# Patient Record
Sex: FEMALE | Race: WHITE | NOT HISPANIC OR LATINO | URBAN - METROPOLITAN AREA
[De-identification: names, ages, dates, MRNs, and addresses within clinical notes are randomized per-mention and may not be internally consistent; named-entity substitution may affect disease eponyms.]

---

## 2019-01-13 ENCOUNTER — SURGERY - HEALTHEAST (OUTPATIENT)
Dept: SURGERY | Facility: HOSPITAL | Age: 79
End: 2019-01-13

## 2019-01-13 ENCOUNTER — ANESTHESIA - HEALTHEAST (OUTPATIENT)
Dept: SURGERY | Facility: HOSPITAL | Age: 79
End: 2019-01-13

## 2019-01-13 ASSESSMENT — MIFFLIN-ST. JEOR
SCORE: 1022.88
SCORE: 972.98

## 2019-01-14 ENCOUNTER — HOME CARE/HOSPICE - HEALTHEAST (OUTPATIENT)
Dept: HOME HEALTH SERVICES | Facility: HOME HEALTH | Age: 79
End: 2019-01-14

## 2019-01-14 ASSESSMENT — MIFFLIN-ST. JEOR: SCORE: 970.26

## 2019-01-16 ENCOUNTER — HOME CARE/HOSPICE - HEALTHEAST (OUTPATIENT)
Dept: HOME HEALTH SERVICES | Facility: HOME HEALTH | Age: 79
End: 2019-01-16

## 2019-01-17 ENCOUNTER — HOME CARE/HOSPICE - HEALTHEAST (OUTPATIENT)
Dept: HOME HEALTH SERVICES | Facility: HOME HEALTH | Age: 79
End: 2019-01-17

## 2019-01-23 ENCOUNTER — HOME CARE/HOSPICE - HEALTHEAST (OUTPATIENT)
Dept: HOME HEALTH SERVICES | Facility: HOME HEALTH | Age: 79
End: 2019-01-23

## 2019-01-24 ENCOUNTER — RECORDS - HEALTHEAST (OUTPATIENT)
Dept: ADMINISTRATIVE | Facility: OTHER | Age: 79
End: 2019-01-24

## 2021-06-02 VITALS — BODY MASS INDEX: 24.05 KG/M2 | HEIGHT: 61 IN | WEIGHT: 127.4 LBS

## 2021-06-16 PROBLEM — S42.211A FRACTURE OF HUMERUS NECK, RIGHT, CLOSED, INITIAL ENCOUNTER: Status: ACTIVE | Noted: 2019-01-13

## 2021-06-16 PROBLEM — E78.5 HYPERLIPIDEMIA LDL GOAL <100: Status: ACTIVE | Noted: 2019-01-13

## 2021-06-16 PROBLEM — S43.004A SHOULDER DISLOCATION, RIGHT, INITIAL ENCOUNTER: Status: ACTIVE | Noted: 2019-01-13

## 2021-06-16 PROBLEM — S42.213A FRACTURE OF HUMERUS NECK: Status: ACTIVE | Noted: 2019-01-14

## 2021-06-16 PROBLEM — I10 ESSENTIAL HYPERTENSION, BENIGN: Status: ACTIVE | Noted: 2019-01-13

## 2021-06-16 PROBLEM — S42.293A CLOSED 3-PART FRACTURE OF PROXIMAL HUMERUS: Status: ACTIVE | Noted: 2019-01-13

## 2021-06-23 NOTE — ANESTHESIA PREPROCEDURE EVALUATION
Anesthesia Evaluation      Patient summary reviewed     Airway   Mallampati: II  Neck ROM: full   Pulmonary - negative ROS    breath sounds clear to auscultation                         Cardiovascular   Exercise tolerance: > or = 4 METS  (+) hypertension, ,     ECG reviewed (1st deg AV block)  Rhythm: regular     ROS comment:   Mildly dilated ascending aorta     Neuro/Psych - negative ROS     Endo/Other    (+) arthritis,   (-) no obesity     GI/Hepatic/Renal    (+) GERD intermittent and well controlled,        Other findings:     NPO > 8 hrs       ECHO 11/13/17:    Clinical Indications:Lower extremity edema         CONCLUSION:    1.  Normal LV systolic function, EF 65%.    2.  Mild concentric LVH.    3.  No gross regional wall motion abnormalities identified.    4.  Normal RV size and function.    5.  Moderate LA dilatation.    6.  Sclerotic aortic valve without significant stenosis.    7.  Mild tricuspid regurgitation.    8.  Mildly dilated ascending aorta, 3.9 cm.    9.  Estimated RV systolic pressure= 25 mmHg + right atrial pressure.      IVC is normal in size and responsive to inspiration indicating normal     RA pressure.    10.  Incidentally noted echodensity in the liver (probable cyst).      Consider liver u/s for further evaluation as clinically indicated.      Left Ventricular Ejection Fraction: 65 %      Results for ISH GONZALEZ (MRN [id number]) as of 1/13/2019 1/13/2019 16:23  Sodium: 133 (L)  Potassium: 3.5  Chloride: 97 (L)  CO2: 26  Anion Gap, Calculation: 10  BUN: 11  Creatinine: 0.58 (L)  GFR MDRD Af Amer: >60  GFR MDRD Non Af Amer: >60  Calcium: 8.6  Glucose: 124  WBC: 14.9 (H)  RBC: 4.40  Hemoglobin: 12.5  Hematocrit: 37.1  MCV: 84  MCH: 28.4  MCHC: 33.7  RDW: 13.8  Platelets: 252  MPV: 10.3        Dental    (+) poor dentition                       Anesthesia Plan  Planned anesthetic: peripheral nerve block and general LMA    ASA 2 - emergent   Induction: intravenous   Anesthetic plan and  risks discussed with: patient and child/children  Anesthesia plan special considerations: antiemetics,   Post-op plan: routine recovery

## 2021-06-23 NOTE — ANESTHESIA PROCEDURE NOTES
Peripheral Block    Patient location during procedure: pre-op  Start time: 1/13/2019 5:47 PM  End time: 1/13/2019 5:48 PM  post-op analgesia per surgeon order as noted in medical record  Staffing:  Performing  Anesthesiologist: Citlali Foley MD  Preanesthetic Checklist  Completed: patient identified, site marked, risks, benefits, and alternatives discussed, timeout performed, consent obtained, airway assessed, oxygen available, suction available, emergency drugs available and hand hygiene performed  Peripheral Block  Block type: other, superficial cervical plexus  Prep: ChloraPrep  Patient position: sitting  Patient monitoring: cardiac monitor, continuous pulse oximetry, heart rate and blood pressure  Laterality: right  Injection technique: ultrasound guided    Ultrasound used to visualize needle placement in proximity to nerve being blocked: yes   Permanent ultrasound image captured for medical record  Sterile gel and probe cover used for ultrasound.    Needle  Needle type: echogenic   Needle gauge: 20G  Needle length: 4 in  no peripheral nerve catheter placed  Assessment  Injection assessment: no difficulty with injection, negative aspiration for heme, no paresthesia on injection and incremental injection  Additional Notes    Right SCP block with bupivacaine 0.5% 4 cc.    Citlali Foley MD  Staff Anesthesiologist  [facility]

## 2021-06-23 NOTE — ANESTHESIA POSTPROCEDURE EVALUATION
Patient: Katie Gary  OPEN REDUCTION INTERNAL FIXATION, FRACTURE, HUMERUS, PROXIMAL  Anesthesia type: general    Patient location: PACU  Last vitals:   Vitals:    01/13/19 2050   BP: 121/65   Pulse: 86   Resp: 25   Temp: 37.1  C (98.8  F)   SpO2: 97%     Post vital signs: stable  Level of consciousness: awake and responds to simple questions  Post-anesthesia pain: pain controlled  Post-anesthesia nausea and vomiting: no  Pulmonary: unassisted, spontaneous ventilation, nasal cannula  Cardiovascular: stable and blood pressure at baseline  Hydration: adequate  Anesthetic events: no    QCDR Measures:  ASA# 11 - Leonila-op Cardiac Arrest: ASA11B - Patient did NOT experience unanticipated cardiac arrest  ASA# 12 - Leonila-op Mortality Rate: ASA12B - Patient did NOT die  ASA# 13 - PACU Re-Intubation Rate: ASA13B - Patient did NOT require a new airway mgmt  ASA# 10 - Composite Anes Safety: ASA10A - No serious adverse event    Additional Notes: tolerated anesthetic well. Denies pain. Block should last 18-24 hours.

## 2021-06-23 NOTE — ANESTHESIA PROCEDURE NOTES
Peripheral Block    Patient location during procedure: pre-op  Start time: 1/13/2019 5:44 PM  End time: 1/13/2019 5:47 PM  post-op analgesia per surgeon order as noted in medical record  Staffing:  Performing  Anesthesiologist: Citlali Foley MD  Preanesthetic Checklist  Completed: patient identified, site marked, risks, benefits, and alternatives discussed, timeout performed, consent obtained, airway assessed, oxygen available, suction available, emergency drugs available and hand hygiene performed  Peripheral Block  Block type: brachial plexus, interscalene  Prep: ChloraPrep  Patient position: sitting  Patient monitoring: cardiac monitor, continuous pulse oximetry, heart rate and blood pressure  Laterality: right  Injection technique: ultrasound guided    Ultrasound used to visualize needle placement in proximity to nerve being blocked: yes   Permanent ultrasound image captured for medical record  Sterile gel and probe cover used for ultrasound.    Needle  Needle type: echogenic   Needle gauge: 20G  Needle length: 4 in  no peripheral nerve catheter placed  Assessment  Injection assessment: no difficulty with injection, negative aspiration for heme, no paresthesia on injection and incremental injection  Additional Notes    Right IS block with bupivacaine 0.5% 21 cc.    Citlali Foley MD  Staff Anesthesiologist  [facility]

## 2021-06-23 NOTE — ANESTHESIA CARE TRANSFER NOTE
Last vitals:   Vitals:    01/13/19 2024   BP: 126/68   Pulse: 92   Resp: 14   Temp: 37.8  C (100.1  F)   SpO2: 99%     Patient's level of consciousness is drowsy  Spontaneous respirations: yes  Maintains airway independently: yes  Dentition unchanged: yes  Oropharynx: oropharynx clear of all foreign objects    QCDR Measures:  ASA# 20 - Surgical Safety Checklist: WHO surgical safety checklist completed prior to induction    PQRS# 430 - Adult PONV Prevention: 4558F - Pt received => 2 anti-emetic agents (different classes) preop & intraop  ASA# 8 - Peds PONV Prevention: NA - Not pediatric patient, not GA or 2 or more risk factors NOT present  PQRS# 424 - Leonila-op Temp Management: 4559F - At least one body temp DOCUMENTED => 35.5C or 95.9F within required timeframe  PQRS# 426 - PACU Transfer Protocol: - Transfer of care checklist used  ASA# 14 - Acute Post-op Pain: ASA14B - Patient did NOT experience pain >= 7 out of 10